# Patient Record
Sex: FEMALE | Race: WHITE | NOT HISPANIC OR LATINO | Employment: PART TIME | URBAN - METROPOLITAN AREA
[De-identification: names, ages, dates, MRNs, and addresses within clinical notes are randomized per-mention and may not be internally consistent; named-entity substitution may affect disease eponyms.]

---

## 2018-01-10 NOTE — RESULT NOTES
Verified Results  [facility]) CMP14+eGFR [id number] 07:29AM Julio Beaufort     Test Name Result Flag Reference   Glucose, Serum 91 mg/dL  65-99   BUN 16 mg/dL  6-20   Creatinine, Serum 0 80 mg/dL  0 57-1 00   eGFR If NonAfricn Am 106 mL/min/1 73  >59   eGFR If Africn Am 123 mL/min/1 73  >59   BUN/Creatinine Ratio 20  8-20   Sodium, Serum 139 mmol/L  134-144   Potassium, Serum 4 5 mmol/L  3 5-5 2   Chloride, Serum 102 mmol/L     Carbon Dioxide, Total 22 mmol/L  18-29   Calcium, Serum 9 4 mg/dL  8 7-10 2   Protein, Total, Serum 6 9 g/dL  6 0-8 5   Albumin, Serum 4 1 g/dL  3 5-5 5   Globulin, Total 2 8 g/dL  1 5-4 5   A/G Ratio 1 5  1 1-2 5   Bilirubin, Total 0 3 mg/dL  0 0-1 2   Alkaline Phosphatase, S 57 IU/L     AST (SGOT) 21 IU/L  0-40   ALT (SGPT) 19 IU/L  0-32     (LC) Lipid Panel With LDL/HDL Ratio [id number] 07:29AM Julio Beaufort     Test Name Result Flag Reference   Cholesterol, Total 162 mg/dL  100-199   Triglycerides 57 mg/dL  0-149   HDL Cholesterol 53 mg/dL  >39   According to ATP-III Guidelines, HDL-C >59 mg/dL is considered a  negative risk factor for CHD  VLDL Cholesterol Eduardo 11 mg/dL  5-40   LDL Cholesterol Calc 98 mg/dL  0-99   LDL/HDL Ratio 1 8 ratio units  0 0-3 2   LDL/HDL Ratio                                                             Men  Women                                               1/2 Avg  Risk  1 0    1 5                                                   Avg Risk  3 6    3 2                                                2X Avg  Risk  6 2    5 0                                                3X Avg  Risk  8 0    6 1     (LC) TSH+Free T4 [id number] 07:29AM Julio Beaufort     Test Name Result Flag Reference   TSH 3 400 uIU/mL  0 450-4 500   T4,Free(Direct) 1 08 ng/dL  0 82-1 77       Discussion/Summary   lab acceptable     Signatures   Electronically signed by : Brittany Schmid DO; Jan 18 2016 10:15AM EST                       (Author)

## 2018-01-13 NOTE — PROGRESS NOTES
Assessment   1  Encounter for preventive health examination (V70 0) (Z00 00)  2  Need for HPV vaccination (V04 89) (Z23)  3  Encounter for screening for cardiovascular disorders (V81 2) (Z13 6)  4  Screening for hypothyroidism (V77 0) (Z13 29)  5  Family history of hypothyroidism (V18 19) (Z83 49) : Mother, Maternal [address] [employer] Maintenance    · Call [phone] if: You find a new or different kind of lump in your breast ;  Status:Complete;   Done: 07EAZ9607   · Call [phone] if: You have any warning signs of skin cancer ; Status:Complete;    Done: 82AQG3419   · Begin or continue regular aerobic exercise   Gradually work up to at least 3 sessions of 30  minutes of exercise a week ; Status:Complete;   Done: 46JED2334   · Brush your teeth 3 times a day and floss at least once a day ; Status:Complete;   Done:  43XKL8229   · Decreasing the stress in your life may help your condition improve ; Status:Complete;    Done: 41KWG3065   · Eat a low fat and low cholesterol diet ; Status:Complete;   Done: 49XKW8606   · Keep a diary of when and what you eat ; Status:Complete;   Done: 58HVK4950   · Schedule an appointment in 48-72 hours to have your TB (tuberculin) skin test  interpreted by a trained healthcare provider ; Status:Complete;   Done: 93ZUY4942   · Some eating tips that can help you lose weight ; Status:Complete;   Done: 68HQI4371   · Stretch and warm up your muscles during the first 10 minutes , then cool down your  muscles for the last 10 minutes of exercise ; Status:Complete;   Done: 78HCW8897   · There are many ways to reduce your risk of catching or spreading a sexually transmitted  Infection ; Status:Complete;   Done: 08FOW1982   · Use a sun block product with an SPF of 15 or more ; Status:Complete;   Done:  01ENM3647   · Vitamins can help you get daily requirements that your diet may not be giving you ;  Status:Complete;   Done: 18GZX7588   · We recommend that you bring your body mass index down to 26 ; Status:Complete;    Done: 89PVM7377   · We want you to follow the Therapeutic Lifestyle Changes (TLC) diet ; Status:Complete;    Done: 10VFI3310  Health Maintenance, Encounter for screening for cardiovascular disorders, Screening for  hypothyroidism    · (LC) CBC+Platelet+Hem Review; Status:Active; Requested WLL:25RME7236;    · ([facility]) CMP14+eGFR; Status:Active; Requested KIW:17ASW2448;    · (LC) Lipid Panel With LDL/HDL Ratio; Status:Active; Requested XND:32RPY5430;    · (LC) TSH+Free T4; Status:Active; Requested DANIA:62MNI0361;   Need for HPV vaccination    · Administer: Gardasil 9 Intramuscular Suspension; INJECT 0 5  ML Intramuscular; To Be  Done: 67ZGL8756    Chief Complaint  CPE      History of Present Illness  HM, Adult Female: The patient is being seen for a health maintenance evaluation  Social History: Household members include parents and sibling  She is unmarried  Work status: occupation: prof student  The patient has never smoked cigarettes  She reports occasional alcohol use  She has never used illicit drugs  General Health:   Screening:   HPI: Pt is here for the first time  here for a physial  no paperwor    Pt states her tonsils are always very big and they tend to get infected  Pt states she has a ahrd time to loose weight and she is always tired  Review of Systems    Constitutional: feeling tired, but No fever, no chills, feels well, no tiredness, no recent weight gain or weight loss and as noted in HPI  Eyes: No complaints of eye pain, no red eyes, no eyesight problems, no discharge, no dry eyes, no itching of eyes  ENT: no complaints of earache, no loss of hearing, no nose bleeds, no nasal discharge, no sore throat, no hoarseness  Cardiovascular: No complaints of slow heart rate, no fast heart rate, no chest pain, no palpitations, no leg claudication, no lower extremity edema     Respiratory: No complaints of shortness of breath, no wheezing, no cough, no SOB on exertion, no orthopnea, no PND  Gastrointestinal: No complaints of abdominal pain, no constipation, no nausea or vomiting, no diarrhea, no bloody stools  Genitourinary: No complaints of dysuria, no incontinence, no pelvic pain, no dysmenorrhea, no vaginal discharge or bleeding  Musculoskeletal: No complaints of arthralgias, no myalgias, no joint swelling or stiffness, no limb pain or swelling  Integumentary: No complaints of skin rash or lesions, no itching, no skin wounds, no breast pain or lump  Neurological: No complaints of headache, no confusion, no convulsions, no numbness, no dizziness or fainting, no tingling, no limb weakness, no difficulty walking  Psychiatric: Not suicidal, no sleep disturbance, no anxiety or depression, no change in personality, no emotional problems  Endocrine: No complaints of proptosis, no hot flashes, no muscle weakness, no deepening of the voice, no feelings of weakness  Hematologic/Lymphatic: No complaints of swollen glands, no swollen glands in the neck, does not bleed easily, does not bruise easily  Active Problems   1  Allergic rhinitis (477 9) (J30 9)  2  Ankle pain, unspecified laterality (719 47) (M25 579)  3  Ankle Sprain (845 00)  4  Concussion (850 9) (S06 0X9A)  5  Dysuria (788 1) (R30 0)  6  Need for HPV vaccination (V04 89) (Z23)  7  Need for HPV vaccination (V04 89) (Z23)  8  Screening examination for pulmonary tuberculosis (V74 1) (Z11 1)  9   Screening examination for pulmonary tuberculosis (V74 1) (Z11 1)    Past Medical History    · Need for HPV vaccination (V04 89) (Z23)   · Need for HPV vaccination (V04 89) (Z23)    Surgical History    · Denied: History of Reported Prior Surgical / Procedural History    Family History    · Family history of hypothyroidism (V18 19) (Z83 49)    · Family history of Allergic Rhinitis    · Family history of hypothyroidism (V18 19) (Z83 49)    · Family history of Breast Cancer (V16 3)    · Family history of Skin Cancer (V16 8)    · Family history of Breast Cancer (V16 3)    Social History    · Denied: History of Alcohol Use (History)   · Daily Coffee Consumption (1  Cups/Day)   · Daily Cola Consumption (1  Cans/Day)   · Daily Tea Consumption (2  Cups/Day)   · Never A Smoker    Current Meds  1  Desloratadine 5 MG Oral Tablet; take one tablet by mouth every day; Therapy: 24CTG2333 to (Renetta Teixeira)  Requested for: 85ZJB7762; Last   Rx:[id number] Ordered  2  Nasonex 50 MCG/ACT Nasal Suspension; USE 1 TO 2 SPRAYS IN EACH NOSTRIL   ONCE DAILY; Therapy: 23IQU2607 to (Last Rx:75Huj4489)  Requested for: 81Rwz9443 Ordered  3  No Reported Medications Recorded  4  Sinus Wash Saline Refills 2300-700 MG Nasal Packet; saline wash as needed; Therapy: 87DDT8244 to (Last Rx:97Now5322) Ordered    Allergies   1  No Known Drug Allergies   2  Animal dander - Cats  3  Animal dander - Dogs  4  Grass  5  Mold  6  Peanuts  7  Pollen  8  Ragweed  9  Trees    Vitals   Recorded: 95FIZ3940 02:56PM   Temperature 97 5 F   Heart Rate 72   Respiration 16   Systolic 301   Diastolic 72   Height 5 ft 4 5 in   Weight 175 lb    BMI Calculated 29 57   BSA Calculated 1 86     Physical Exam    Constitutional   General appearance: Abnormal   overweight  Eyes   Conjunctiva and lids: No swelling, erythema or discharge  Pupils and irises: Equal, round, reactive to light  Ophthalmoscopic examination: Normal fundi and optic discs  Ears, Nose, Mouth, and Throat   External inspection of ears and nose: Normal     Otoscopic examination: Tympanic membranes translucent with normal light reflex  Canals patent without erythema  Hearing: Normal     Nasal mucosa, septum, and turbinates: Normal without edema or erythema  Lips, teeth, and gums: Normal, good dentition  Oropharynx: Normal with no erythema, edema, exudate or lesions  Neck   Neck: Supple, symmetric, trachea midline, no masses  Thyroid: Normal, no thyromegaly      Pulmonary Respiratory effort: No increased work of breathing or signs of respiratory distress  Percussion of chest: Normal     Palpation of chest: Normal     Auscultation of lungs: Clear to auscultation  Cardiovascular   Palpation of heart: Normal PMI, no thrills  Auscultation of heart: Normal rate and rhythm, normal S1 and S2, no murmurs  Carotid pulses: 2+ bilaterally  Abdominal aorta: Normal     Femoral pulses: 2+ bilaterally  Pedal pulses: 2+ bilaterally  Examination of extremities for edema and/or varicosities: Normal     Chest   Breasts: Normal, no dimpling or skin changes appreciated  Palpation of breasts and axillae: Normal, no masses palpated  Abdomen   Abdomen: Non-tender, no masses  Liver and spleen: No hepatomegaly or splenomegaly  Examination for hernias: No hernia appreciated  Anus, perineum, and rectum: Normal sphincter tone, no masses, no prolapse  Stool sample for occult blood: Negative  Genitourinary   External genitalia and vagina: Normal, no lesions appreciated  Urethra: Normal, no discharge  Bladder: Not distended, no tenderness  Cervix: Normal, no lesions, Pap obtained  Uterus: Normal size, no tenderness, no masses  Adnexa/Parametria: Normal, no masses or tenderness  Lymphatic   Palpation of lymph nodes in neck: No lymphadenopathy  Palpation of lymph nodes in axillae: No lymphadenopathy  Palpation of lymph nodes in groin: No lymphadenopathy  Palpation of lymph nodes in other areas: No lymphadenopathy  Musculoskeletal   Gait and station: Normal     Digits and nails: Normal without clubbing or cyanosis  Joints, bones, and muscles: Normal     Range of motion: Normal     Stability: Normal     Muscle strength/tone: Normal     Skin   Skin and subcutaneous tissue: Normal without rashes or lesions  Palpation of skin and subcutaneous tissue: Normal turgor  Neurologic   Cranial nerves: Cranial nerves II-XII intact      Reflexes: 2+ and symmetric  Sensation: No sensory loss  Psychiatric   Judgment and insight: Normal     Orientation to person, place, and time: Normal     Recent and remote memory: Intact      Mood and affect: Normal        Procedure    Procedure:   Results: 20/20 in both eyes without corrective device, 20/20 in the right eye without corrective device, 20/20 in the left eye without corrective device      Signatures   Electronically signed by : Adriana Rich DO; Jan 14 2016  4:01PM EST                       (Author)

## 2018-01-15 NOTE — RESULT NOTES
Verified Results  [facility]) CBC+Platelet+Hem Review [id number] 07:29AM Inocente Sanchez     Test Name Result Flag Reference   WBC 5 4 x10E3/uL  3 4-10 8   RBC 4 47 x10E6/uL  3 77-5 28   Hemoglobin 12 9 g/dL  11 1-15 9   Hematocrit 40 0 %  34 0-46  6   MCV 90 fL  79-97   MCH 28 9 pg  26 6-33 0   MCHC 32 3 g/dL  31 5-35 7   RDW 14 3 %  12 3-15 4   Platelets 204 I38O4/SF  150-379   Neutrophils 58 %     Lymphs 30 %     Monocytes 10 %     Eos 1 %     Basos 1 %     Neutrophils Absolute 3 1 X10E3/uL  1 4-7 0   Lymphs (Absolute) 1 6 X10E3/uL  0 7-3 1   Monocytes(Absolute) 0 5 X10E3/uL  0 1-0 9   Eos (Absolute Value) 0 1 X10E3/uL  0 0-0 4   Baso(Absolute) 0 1 X10E3/uL  0 0-0 2   Differential Comment RBC's appear normal      Platelet Comment Adequate  Adequate     [facility]) Cardiovascular Risk Assessment [id number] 07:29AM Inocente Sanchez     Test Name Result Flag Reference   Interpretation Note     Supplement report is available  PDF Image            Discussion/Summary   Lab acceptable

## 2018-04-17 ENCOUNTER — OFFICE VISIT (OUTPATIENT)
Dept: FAMILY MEDICINE CLINIC | Facility: CLINIC | Age: 23
End: 2018-04-17
Payer: COMMERCIAL

## 2018-04-17 VITALS
HEART RATE: 80 BPM | TEMPERATURE: 98.3 F | DIASTOLIC BLOOD PRESSURE: 64 MMHG | RESPIRATION RATE: 18 BRPM | SYSTOLIC BLOOD PRESSURE: 120 MMHG | OXYGEN SATURATION: 99 % | WEIGHT: 185 LBS | BODY MASS INDEX: 31.58 KG/M2 | HEIGHT: 64 IN

## 2018-04-17 DIAGNOSIS — J02.9 SORE THROAT: Primary | ICD-10-CM

## 2018-04-17 LAB — S PYO AG THROAT QL: NEGATIVE

## 2018-04-17 PROCEDURE — 87880 STREP A ASSAY W/OPTIC: CPT | Performed by: NURSE PRACTITIONER

## 2018-04-17 PROCEDURE — 99213 OFFICE O/P EST LOW 20 MIN: CPT | Performed by: NURSE PRACTITIONER

## 2018-04-17 RX ORDER — NORGESTIMATE AND ETHINYL ESTRADIOL 7DAYSX3 28
KIT ORAL
COMMUNITY
Start: 2018-04-16

## 2018-04-17 NOTE — PATIENT INSTRUCTIONS

## 2018-04-17 NOTE — PROGRESS NOTES
Assessment/Plan:    1  Gargle with salt water couple times a day  2  Take NSAID for symptom relief  3  Follow-up condition changes or worsens     Diagnoses and all orders for this visit:    Sore throat  -     POCT rapid strepA    Other orders  -     TRI-SPRINTEC 0 18/0 215/0 25 MG-35 MCG per tablet;           Subjective:      Patient ID: Tairichelle Caldwell is a 25 y o  female  58-year-old female presents with sore throat for about 3 days  Denies medications  Denies fever  No other URI symptoms  The following portions of the patient's history were reviewed and updated as appropriate: allergies and current medications  Review of Systems   Constitutional: Negative  HENT: Positive for sore throat  Respiratory: Negative  Cardiovascular: Negative  Objective:      /64 (BP Location: Left arm, Patient Position: Sitting)   Pulse 80   Temp 98 3 °F (36 8 °C)   Resp 18   Ht 5' 4" (1 626 m)   Wt 83 9 kg (185 lb)   SpO2 99%   BMI 31 76 kg/m²          Physical Exam   Constitutional: She appears well-developed and well-nourished  HENT:   Head: Normocephalic and atraumatic  Right Ear: External ear normal    Left Ear: External ear normal    Nose: Nose normal    Mouth/Throat: Oropharynx is clear and moist    Cardiovascular: Normal rate, regular rhythm, normal heart sounds and intact distal pulses      Pulmonary/Chest: Effort normal and breath sounds normal

## 2018-08-15 ENCOUNTER — OFFICE VISIT (OUTPATIENT)
Dept: FAMILY MEDICINE CLINIC | Facility: CLINIC | Age: 23
End: 2018-08-15
Payer: COMMERCIAL

## 2018-08-15 VITALS
HEIGHT: 64 IN | WEIGHT: 172.56 LBS | OXYGEN SATURATION: 99 % | SYSTOLIC BLOOD PRESSURE: 104 MMHG | HEART RATE: 82 BPM | BODY MASS INDEX: 29.46 KG/M2 | RESPIRATION RATE: 18 BRPM | TEMPERATURE: 98 F | DIASTOLIC BLOOD PRESSURE: 72 MMHG

## 2018-08-15 DIAGNOSIS — L25.5 CONTACT DERMATITIS DUE TO PLANT: Primary | ICD-10-CM

## 2018-08-15 PROCEDURE — 3008F BODY MASS INDEX DOCD: CPT | Performed by: NURSE PRACTITIONER

## 2018-08-15 PROCEDURE — 1036F TOBACCO NON-USER: CPT | Performed by: NURSE PRACTITIONER

## 2018-08-15 PROCEDURE — 99213 OFFICE O/P EST LOW 20 MIN: CPT | Performed by: NURSE PRACTITIONER

## 2018-08-15 RX ORDER — METHYLPREDNISOLONE 4 MG/1
TABLET ORAL
Qty: 21 TABLET | Refills: 0 | Status: SHIPPED | OUTPATIENT
Start: 2018-08-15

## 2018-08-15 NOTE — PROGRESS NOTES
Assessment/Plan:  1  Avoid the sun  2  Use medication as prescribed  3  Follow-up condition changes or worsens       Diagnoses and all orders for this visit:    Contact dermatitis due to plant  -     Methylprednisolone 4 MG TBPK; Use as directed on package          Subjective:      Patient ID: Bijan Lewis is a 25 y o  female  42-year-old female presents with poison ivy for week and a half  Tried some OTC  Not helping  Very itchy  Rash is on her chest abdomen and bilateral legs  The following portions of the patient's history were reviewed and updated as appropriate: allergies and current medications  Review of Systems   Constitutional: Negative  Skin: Positive for rash  Objective:      /72 (BP Location: Left arm, Patient Position: Sitting)   Pulse 82   Temp 98 °F (36 7 °C) (Tympanic)   Resp 18   Ht 5' 4" (1 626 m)   Wt 78 3 kg (172 lb 9 oz)   LMP  (Within Days)   SpO2 99%   BMI 29 62 kg/m²          Physical Exam   Constitutional: She appears well-developed and well-nourished  Skin: Skin is warm  Rash (Contact dermatitis on chest, abdomen, and bilateral shins) noted

## 2022-09-01 ENCOUNTER — TELEPHONE (OUTPATIENT)
Dept: FAMILY MEDICINE CLINIC | Facility: CLINIC | Age: 27
End: 2022-09-01

## 2022-09-01 NOTE — TELEPHONE ENCOUNTER
[facility]- please remove Dr Tay Lee as PCP  Confirmed patient has moved and no longer receives care at our office     Patient moved to [facility]

## 2022-10-02 NOTE — TELEPHONE ENCOUNTER
10/02/22 4:41 PM     Thank you for your request  Your request has been received, reviewed, and the patient chart updated  The PCP has successfully been removed with a patient attribution note  This message will now be completed      Thank you  Cheyenne Lees